# Patient Record
Sex: MALE | Race: AMERICAN INDIAN OR ALASKA NATIVE
[De-identification: names, ages, dates, MRNs, and addresses within clinical notes are randomized per-mention and may not be internally consistent; named-entity substitution may affect disease eponyms.]

---

## 2022-05-10 ENCOUNTER — HOSPITAL ENCOUNTER (EMERGENCY)
Dept: HOSPITAL 5 - ED | Age: 62
LOS: 1 days | Discharge: HOME | End: 2022-05-11
Payer: COMMERCIAL

## 2022-05-10 DIAGNOSIS — R07.81: Primary | ICD-10-CM

## 2022-05-10 PROCEDURE — 96374 THER/PROPH/DIAG INJ IV PUSH: CPT

## 2022-05-10 PROCEDURE — 99282 EMERGENCY DEPT VISIT SF MDM: CPT

## 2022-05-10 PROCEDURE — 93005 ELECTROCARDIOGRAM TRACING: CPT

## 2022-05-11 VITALS — DIASTOLIC BLOOD PRESSURE: 76 MMHG | SYSTOLIC BLOOD PRESSURE: 132 MMHG

## 2022-05-11 NOTE — EMERGENCY DEPARTMENT REPORT
ED Abdominal Pain HPI





- General


Chief Complaint: Abdominal Pain


Stated Complaint: GAS IN STOMACH


Time Seen by Provider: 05/11/22 02:33


Source: patient, family


Mode of arrival: Ambulatory


Limitations: No Limitations





- History of Present Illness


Initial Comments: 





61 yo black male with no pmh presents to ed for evaluation of right rib pain.  

He states that he has been lifting some heavy objects at work the past few days 

and developed pain to right rib area yesterday.  He denies fever, cp, sob, n/v, 

dizziness.  He states that pain is significantly worse with movement.  


MD Complaint: other (right rib area)


-: Gradual, days(s) (1)


Location: R flank


Radiation: none


Migration to: no migration


Severity scale (0 -10): 10


Quality: cramping, aching


Consistency: constant


Worsens With: movement


Associated Symptoms: denies: nausea, vomiting, diarrhea, fever, chills, dysuria,

hematemesis, hematochezia, melena, hematuria, anorexia, syncope





- Related Data


                                  Previous Rx's











 Medication  Instructions  Recorded  Last Taken  Type


 


Cyclobenzaprine [Flexeril] 10 mg PO TID PRN #30 tab 05/11/22 Unknown Rx


 


Naproxen [Naprosyn] 500 mg PO BID #14 tab 05/11/22 Unknown Rx











                                    Allergies











Allergy/AdvReac Type Severity Reaction Status Date / Time


 


No Known Allergies Allergy   Unverified 05/10/22 23:59














ED Review of Systems


ROS: 


Stated complaint: GAS IN STOMACH


Other details as noted in HPI





Comment: All other systems reviewed and negative


Constitutional: denies: chills, fever


Eyes: denies: vision change


ENT: denies: congestion


Respiratory: denies: cough, shortness of breath


Cardiovascular: denies: chest pain, palpitations, dyspnea on exertion, edema, 

syncope, paroxysmal nocturnal dyspnea


Gastrointestinal: denies: abdominal pain, nausea, vomiting, diarrhea, 

hematemesis, melena


Genitourinary: denies: urgency, dysuria, frequency, hematuria, discharge, 

testicular pain


Musculoskeletal: denies: back pain


Skin: denies: rash, lesions


Neurological: denies: headache, weakness, numbness, paresthesias, confusion


Psychiatric: denies: anxiety, depression


Hematological/Lymphatic: denies: easy bleeding, easy bruising





ED Past Medical Hx





- Medications


Home Medications: 


                                Home Medications











 Medication  Instructions  Recorded  Confirmed  Last Taken  Type


 


Cyclobenzaprine [Flexeril] 10 mg PO TID PRN #30 tab 05/11/22  Unknown Rx


 


Naproxen [Naprosyn] 500 mg PO BID #14 tab 05/11/22  Unknown Rx














ED Physical Exam





- General


Limitations: No Limitations


General appearance: alert, in no apparent distress





- Head


Head exam: Present: atraumatic, normocephalic





- Eye


Eye exam: Present: normal appearance.  Absent: conjunctival injection





- Neck


Neck exam: Present: normal inspection, full ROM.  Absent: tenderness, 

lymphadenopathy





- Respiratory


Respiratory exam: Present: normal lung sounds bilaterally, chest wall 

tenderness.  Absent: respiratory distress, wheezes, rales, rhonchi, stridor





- Cardiovascular


Cardiovascular Exam: Present: regular rate, normal heart sounds





- Expanded Cardiovascular Exam


  ** Expanded





                            __________________________














                            __________________________





 1 - tenderness to palpation








- GI/Abdominal


GI/Abdominal exam: Present: soft, normal bowel sounds.  Absent: distended, 

tenderness, guarding, rebound, rigid





- Extremities Exam


Extremities exam: Present: normal inspection, full ROM.  Absent: tenderness, 

normal capillary refill, pedal edema, joint swelling, calf tenderness





- Back Exam


Back exam: Present: normal inspection.  Absent: tenderness, CVA tenderness (R), 

CVA tenderness (L)





- Neurological Exam


Neurological exam: Present: oriented X3





- Psychiatric


Psychiatric exam: Present: normal affect, normal mood





- Skin


Skin exam: Present: warm, dry, intact, normal color





ED Course


                                   Vital Signs











  05/10/22 05/11/22





  23:58 05:37


 


Temperature 97.9 F 


 


Pulse Rate 62 60


 


Respiratory 16 12





Rate  


 


Blood Pressure 135/73 132/76





[Right]  


 


O2 Sat by Pulse 99 100





Oximetry  














- Reevaluation(s)


Reevaluation #1: 





05/11/22 05:11


Right rib pain resolved after medication.





ED Medical Decision Making





- Medical Decision Making








61 yo black male with no pmh presents to ed for evaluation of right rib pain.  

He states that he has been lifting some heavy objects at work the past few days 

and developed pain to right rib area yesterday.  He denies fever, cp, sob, n/v, 

dizziness.  He states that pain is significantly worse with movement.





Musculoskeletal pain only on exam.  Pain improved after medications.  Patient 

will to discharged home with naproxen and flexeril to take as directed and 

advised  to follow up with pcp if worsening symptoms or return to ed for any 

concerning symptoms.  He verbalized understanding of and agreement with plan of 

care.  


Critical care attestation.: 


If time is entered above; I have spent that time in minutes in the direct care 

of this critically ill patient, excluding procedure time.








ED Disposition


Clinical Impression: 


 Rib pain on right side





Disposition: 01 HOME / SELF CARE / HOMELESS


Is pt being admited?: No


Does the pt Need Aspirin: No


Condition: Stable


Instructions:  Chest Wall Pain, Easy-to-Read


Additional Instructions: 


Take medications as prescribed.  Follow-up with primary care provider if no 

improvement or worsening symptoms.  Return to the emergency department as 

needed.


Prescriptions: 


Cyclobenzaprine [Flexeril] 10 mg PO TID PRN #30 tab


 PRN Reason: Muscle Spasm


Naproxen [Naprosyn] 500 mg PO BID #14 tab


Referrals: 


LAURA RICK MD [Primary Care Provider] - 3-5 Days


Forms:  Work/School Release Form(ED), Accompanied Note


Time of Disposition: 05:13